# Patient Record
(demographics unavailable — no encounter records)

---
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
## 2025-05-21 NOTE — ASSESSMENT
[FreeTextEntry1] : Assessment: 72-year-old female with pmh htn, hld, anxiety and depression. Needs assistance with adls and iadls. MMSE 16/30. She is very apathetic. Visual spatial skills not intact. Difficulty with clock. Difficulty following instructions. Poor executive function skills. Unable to multitask.   Diagnostic Impression:     -Dementia -apathy -Anxiety, Depression   Plan: -Educated on importance of lifestyle modifications such as daily exercise, healthy well-balanced diet and good sleep habits  -Continue meds as is     Daugther- [phone]

## 2025-05-21 NOTE — REASON FOR VISIT
[Follow-Up: _____] : a [unfilled] follow-up visit [Formal Caregiver] : formal caregiver [Family Member] : family member [FreeTextEntry1] : cognitive decline

## 2025-05-21 NOTE — DATA REVIEWED
[No studies available for review at this time.] : No studies available for review at this time. [de-identified] : Will upload MRI and neuropsych

## 2025-05-21 NOTE — REVIEW OF SYSTEMS
[Confused or Disoriented] : confusion [Memory Lapses or Loss] : memory loss [Repeating Questions] : repeated questioning about recent events [Difficulty Writing] : difficulty writing [Difficulties in Speech] : speech difficulties [As Noted in HPI] : as noted in HPI [Anxiety] : anxiety [Depression] : depression [Negative] : Heme/Lymph [Decr. Concentrating Ability] : no decrease in concentrating ability [Difficulty with Language] : no ~M difficulty with language [Changed Thought Patterns] : no change in thought patterns [Facial Weakness] : no facial weakness [Arm Weakness] : no arm weakness [Hand Weakness] : no hand weakness [Leg Weakness] : no leg weakness [Poor Coordination] : good coordination [Numbness] : no numbness [Tingling] : no tingling [Abnormal Sensation] : no abnormal sensation [Hypersensitivity] : no hypersensitivity [Seizures] : no convulsions [Dizziness] : no dizziness [Fainting] : no fainting [Lightheadedness] : no lightheadedness [Vertigo] : no vertigo [Cluster Headache] : no cluster headache [Migraine Headache] : no migraine headache [Tension Headache] : no tension-type headache [Difficulty Walking] : no difficulty walking [Inability to Walk] : able to walk [Ataxia] : no ataxia [Frequent Falls] : not falling [Limping] : not limping

## 2025-06-02 NOTE — PHYSICAL EXAM
Meagan Aranda Nurse (supporting Lester Araya MD)Just now (11:31 AM)     TF  I am moving back at the end of the month of June. I will continue to see Dr. Meagan Aranda Nurse (supporting Lester Araya MD)2 minutes ago (11:29 AM)     TF   know I moved to [place] and I would continue to see her… I commute up for my appointments and she is okay with that…       CONNIE Faria2 hours ago (8:54 AM)     LINH Rhodes,     The prescription was sent to your primary [facility] pharmacy listed in [facility], in [place]. It looks like you may have moved to [place]. If so, you should establish with a local neurologist as we are prohibited from providing care to out of state patients.     Thank you,     Violeta MARVIN RN  [facility]       Meagan Aranda Nurse (supporting Lester Araya MD)3 hours ago (8:25 AM)     TF  The issue with my eye just happen on Friday but the pharmacy said they did not have a prescription for me. I checked [facility] and [facility]. Those are the only two local pharmacy here. I went to urgent care for my migraine and also had them check my eye. My daughter came to check on me and had my grandson who had pink eye.       Randy Billy3 hours ago (8:11 AM)     MS Staci Rhodes,     We sent the medrol dose pack to the pharmacy for the migraine. We had no idea that you also had issues with your eyes. Please take care.     Thank you        Meagan Aranda Nurse (supporting Lester Araya MD)2 days ago     TF  I never received the medication. I went to the urgent care.       CONNIE Faria3 days ago     LINH Rhodes,     Dr Soriano sent in the prescriptions last night to your pharmacy. I hope you are feeling better soon!     Thank you,     CONNIE [facility]       Meagan Aranda Nurse (supporting Lester Araya MD)4 days ago     TF  Waiting on  medical guidance for my migraines       Meagan Aranda Nurse (supporting Lester Araya MD)4 days ago     TF  It started last Wednesday, Thursday and the medication provide relief. It started back on Saturday night the medication provided some relief but on Monday, Tuesday, Wednesday and today it is a more severe migraine. I assume because it has been raining.       Meagan Aranda Nurse (supporting Lester Araya MD)4 days ago     TF  I have done all of these…    Lying down / sleeping:     Being in a dark quiet room:     Massage your head:    Cold pack on your head/neck:     Hot pack on your head/neck:  Taking prescription medication:     I am will to try Medrol dose pack. I have taken my regular migraine medication it has provided some relief in the beginning when the migraine was normal. Now it is more intense and I am sensitive to light and nauseated. I can feel it behind my eyes as well now…       You  Meagan Billy4 days ago     MS Staci Rhodes,,     We will need more information to better assist you. Please fill out the request below so that we can advise you of the correct plan of care     Acute Migraine assessment:     Is this your typical migraine?       Describe any change in character from past migraines.  :      Location of Pain (select all that apply):    # Days pain has been present:       Describe the pain:    Intensity of the headaches:               With medication:               Without medication   Associated Symptoms (check all that apply):     Non-pharmaceutical interventions tried and outcome:              [] Lying down / sleeping:    [] Being in a dark quiet room:    [] Massage your head:   [] Cold pack on your head/neck:    [] Hot pack on your head/neck:    [] Other:      Abortive Medications tried:    Current preventative medication list:    Any missed doses?    Any other relevant information:    Any medications contraindicated?  Tried and failed?        Willing to try Medrol Dosepak?    Last taken:    Willing to try Toradol/Decadron injections?    Last taken:    Advised patient to seek immediate medical treatment in ED for any concerning symptoms or changes to condition.        Meagan Aranda Nurse (supporting Lester Araya MD)4 days ago     TF  I have had this migraine for a week now. It has after 3 days let up and started right back. I need some sort of relief.   [General Appearance - Alert] : alert [Affect] : the affect was normal [Person] : oriented to person [Place] : oriented to place [Remote Intact] : remote memory intact [Span Intact] : the attention span was normal [Concentration Intact] : normal concentrating ability [Naming Objects] : no difficulty naming common objects [Repeating Phrases] : no difficulty repeating a phrase [Writing A Sentence] : no difficulty writing a sentence [Fluency] : fluency intact [Comprehension] : comprehension intact [Reading] : reading intact [Vocabulary] : adequate range of vocabulary [Total Score ___ / 30] : the patient achieved a score of [unfilled] /30 [Date / Time ___ / 5] : date / time [unfilled] / 5 [Place ___ / 5] : place [unfilled] / 5 [Registration ___ / 3] : registration [unfilled] / 3 [Serial Sevens ___/5] : serial sevens [unfilled] / 5 [Naming 2 Objects ___ / 2] : naming two objects [unfilled] / 2 [Repeating a Sentence ___ / 1] : repeating a sentence [unfilled] / 1 [Writing a Sentence ___ / 1] : write sentence [unfilled] / 1 [3-stage Verbal Command ___ / 3] : three-stage verbal command [unfilled] / 3 [Written Command ___ / 1] : written command [unfilled] / 1 [Copy a Design ___ / 1] : copy a design [unfilled] / 1 [Recall ___ / 3] : recall [unfilled] / 3 [Cranial Nerves Optic (II)] : visual acuity intact bilaterally,  visual fields full to confrontation, pupils equal round and reactive to light [Cranial Nerves Oculomotor (III)] : extraocular motion intact [Cranial Nerves Trigeminal (V)] : facial sensation intact symmetrically [Cranial Nerves Facial (VII)] : face symmetrical [Cranial Nerves Vestibulocochlear (VIII)] : hearing was intact bilaterally [Cranial Nerves Glossopharyngeal (IX)] : tongue and palate midline [Cranial Nerves Accessory (XI - Cranial And Spinal)] : head turning and shoulder shrug symmetric [Cranial Nerves Hypoglossal (XII)] : there was no tongue deviation with protrusion [Motor Tone] : muscle tone was normal in all four extremities [Motor Strength] : muscle strength was normal in all four extremities [Motor Handedness Right-Handed] : the patient is right hand dominant [Abnormal Walk] : normal gait [2+] : Ankle jerk left 2+ [Sclera] : the sclera and conjunctiva were normal [PERRL With Normal Accommodation] : pupils were equal in size, round, reactive to light, with normal accommodation [Extraocular Movements] : extraocular movements were intact [Full Visual Field] : full visual field [Time] : disoriented to time [Short Term Intact] : short term memory impaired [Registration Intact] : recent registration memory impaired [Visual Intact] : visual attention was ~T ~L decreased [Current Events] : inadequate knowledge of current events [Past History] : inadequate knowledge of personal past history [Motor Strength Upper Extremities Bilaterally] : strength was normal in both upper extremities [Motor Strength Lower Extremities Bilaterally] : strength was normal in both lower extremities [Romberg's Sign] : Romberg's sign was negtive [Limited Balance] : balance was intact [Tremor] : no tremor present [FreeTextEntry4] : Mental Status Exam   Presidents: difficulty  Alternating Pattern: ok Spiral: ok Clock: 1/3 Repetition: ok Trail A: B:  Fluency: A: Animals:   Go-No-Go: difficulty  R/L discrimination on self and examiner: ok Cross-line commands: ok Praxis: ok - Motor: ok -Dynamic/Luria: difficulty  -Ideomotor/Imitation:  ok -Ideational/writing/closing-in: ok -Dressing: ok

## 2025-06-11 NOTE — PHYSICAL EXAM
[No Acute Distress] : no acute distress [Alert] : alert [Appropriately responsive] : appropriately responsive [No Lymphadenopathy] : no lymphadenopathy [Soft] : soft [Non-tender] : non-tender [Non-distended] : non-distended [No HSM] : No HSM [No Mass] : no mass [No Lesions] : no lesions [Oriented x3] : oriented x3 [Examination Of The Breasts] : a normal appearance [No Masses] : no breast masses were palpable [Labia Minora] : normal [Labia Majora] : normal [Atrophy] : atrophy [Normal] : normal [Uterine Adnexae] : normal

## 2025-06-11 NOTE — PHYSICAL EXAM
[Alert] : alert [Appropriately responsive] : appropriately responsive [No Acute Distress] : no acute distress [No Lymphadenopathy] : no lymphadenopathy [Non-tender] : non-tender [Soft] : soft [Non-distended] : non-distended [No HSM] : No HSM [No Mass] : no mass [No Lesions] : no lesions [Oriented x3] : oriented x3 [Examination Of The Breasts] : a normal appearance [No Masses] : no breast masses were palpable [Labia Majora] : normal [Labia Minora] : normal [Atrophy] : atrophy [Normal] : normal [Uterine Adnexae] : normal

## 2025-06-13 NOTE — HISTORY OF PRESENT ILLNESS
[Patient reported mammogram was normal] : Patient reported mammogram was normal [Currently Active] : currently active [Patient refuses STI testing] : Patient refuses STI testing [FreeTextEntry1] :  73-year-old, female, , ; Menopause at age 55. She presents for annual gyn exam and offers no complaints. She endorses sexual activity with her .  She denies breakthrough bleeding, vaginal discharge and vaginitis sxs, or urinary complaints.   OB History:  x 3 (2 M, 1 F) Medical History: Dementia Surgical History: bunion, toe fusion, tka, meniscectomy; bilateral knee replacements; D&C Hysteroscopy, LSC BTL, Hysterectomy  Familial History: Maternal: Breast Cancer diagnosed at 88-years-old. Allergies: DEMAROL, PCN, Ibuprofen   [Mammogramdate] : 6/2024 [PapSmeardate] : 1/2024 [BoneDensityDate] : 1/2024

## 2025-06-13 NOTE — PLAN
[FreeTextEntry1] :  Routine GYN Exam: - Discussed and reviewed importance of monthly BSE - Importance safe sexual practices discussed - Refused STI testing today. - Vaginal Pap/HPV test collected and sent at today's visit. - RX mammo given to pt with locations and instructions. - Patient is unsure of last Colonoscopy.  - Osteoporosis prevention; recommending Vitamin D/Calcium supplementation and weight bearing exercise to maintain bone density; s/p DEXA 1/2024 and due next 1/2026.  - Follow up with PCP for recommended HCM, vaccinations and CA screening  RTO in 1 year or sooner if needed.

## 2025-06-13 NOTE — PROCEDURE
[Liquid Base] : liquid base [Cervical Pap Smear] : cervical Pap smear [Tolerated Well] : the patient tolerated the procedure well [No Complications] : there were no complications [Vaginal Pap Smear] : vaginal Pap smear

## 2025-06-13 NOTE — COUNSELING
[Body Image] : body image [Breast Self Exam] : breast self exam [Confidentiality] : confidentiality [STD (testing, results, tx)] : STD (testing, results, tx) [Lab Results] : lab results [Medication Management] : medication management

## 2025-06-13 NOTE — PROCEDURE
[Cervical Pap Smear] : cervical Pap smear [Liquid Base] : liquid base [No Complications] : there were no complications [Tolerated Well] : the patient tolerated the procedure well [Vaginal Pap Smear] : vaginal Pap smear

## 2025-07-21 NOTE — HISTORY OF PRESENT ILLNESS
[FreeTextEntry1] : see HPI [de-identified] : Here for AWV. Here with daughter. Patient is AAOX3,NAD. No chest pain , SOB, fever, chills. She has been using Semiglutide injection for Obesity and has used 29 injections ordered by endocrine. She sees cardiology -last visit 6 months ago.  Last eye exam was at [facility]. She has urine frequency at night.

## 2025-07-21 NOTE — HEALTH RISK ASSESSMENT
[Good] : ~his/her~  mood as  good [No falls in past year] : Patient reported no falls in the past year [0] : 2) Feeling down, depressed, or hopeless: Not at all (0) [PHQ-2 Negative - No further assessment needed] : PHQ-2 Negative - No further assessment needed [No] : Did not review medication list for presence of high-risk medications. [Never] : Never [NO] : No [No Retinopathy] : No retinopathy [Patient reported mammogram was normal] : Patient reported mammogram was normal [Patient reported PAP Smear was normal] : Patient reported PAP Smear was normal [Patient reported bone density results were abnormal] : Patient reported bone density results were abnormal [HIV test declined] : HIV test declined [Hepatitis C test declined] : Hepatitis C test declined [None] : None [With Family] : lives with family [Unemployed] : unemployed [] :  [Sexually Active] : sexually active [Feels Safe at Home] : Feels safe at home [Fully functional (bathing, dressing, toileting, transferring, walking, feeding)] : Fully functional (bathing, dressing, toileting, transferring, walking, feeding) [Fully functional (using the telephone, shopping, preparing meals, housekeeping, doing laundry, using] : Fully functional and needs no help or supervision to perform IADLs (using the telephone, shopping, preparing meals, housekeeping, doing laundry, using transportation, managing medications and managing finances) [Independent] : managing finances [Reports normal functional visual acuity (ie: able to read med bottle)] : Reports normal functional visual acuity [With Patient/Caregiver] : , with patient/caregiver [Designated Healthcare Proxy] : Designated healthcare proxy [Name: ___] : Health Care Proxy's Name: [unfilled]  [Relationship: ___] : Relationship: [unfilled] [de-identified] : walking [de-identified] : regular [[id number]] : 0 [EyeExamDate] : 07/23 [Change in mental status noted] : No change in mental status noted [Language] : denies difficulty with language [Handling Complex Tasks] : denies difficulty handling complex tasks [Reports changes in hearing] : Reports no changes in hearing [Reports changes in vision] : Reports no changes in vision [Reports changes in dental health] : Reports no changes in dental health [Smoke Detector] : no smoke detector [Carbon Monoxide Detector] : no carbon monoxide detector [Seat Belt] : does not use seat belt [Sunscreen] : does not use sunscreen [MammogramDate] : 06/25 [MammogramComments] : wnl [PapSmearDate] : 06/25 [PapSmearComments] : wnl [BoneDensityDate] : 01/24 [BoneDensityComments] : Osteoporosis [ColonoscopyComments] : per colorectal specialist [AdvancecareDate] : 07/25

## 2025-07-21 NOTE — ASSESSMENT
[Vaccines Reviewed] : Immunizations reviewed today. Please see immunization details in the vaccine log within the immunization flowsheet.  [FreeTextEntry1] : ekg nsr